# Patient Record
Sex: MALE | Race: WHITE | NOT HISPANIC OR LATINO | Employment: FULL TIME | URBAN - METROPOLITAN AREA
[De-identification: names, ages, dates, MRNs, and addresses within clinical notes are randomized per-mention and may not be internally consistent; named-entity substitution may affect disease eponyms.]

---

## 2024-03-04 ENCOUNTER — APPOINTMENT (OUTPATIENT)
Dept: RADIOLOGY | Facility: HOSPITAL | Age: 34
End: 2024-03-04
Payer: MEDICAID

## 2024-03-04 ENCOUNTER — HOSPITAL ENCOUNTER (EMERGENCY)
Facility: HOSPITAL | Age: 34
Discharge: HOME | End: 2024-03-05
Attending: EMERGENCY MEDICINE
Payer: MEDICAID

## 2024-03-04 VITALS
TEMPERATURE: 97.9 F | SYSTOLIC BLOOD PRESSURE: 158 MMHG | WEIGHT: 215 LBS | HEART RATE: 63 BPM | OXYGEN SATURATION: 100 % | BODY MASS INDEX: 30.1 KG/M2 | DIASTOLIC BLOOD PRESSURE: 96 MMHG | HEIGHT: 71 IN | RESPIRATION RATE: 18 BRPM

## 2024-03-04 DIAGNOSIS — M25.561 ACUTE PAIN OF RIGHT KNEE: Primary | ICD-10-CM

## 2024-03-04 PROCEDURE — 73564 X-RAY EXAM KNEE 4 OR MORE: CPT | Mod: RIGHT SIDE | Performed by: RADIOLOGY

## 2024-03-04 PROCEDURE — 73564 X-RAY EXAM KNEE 4 OR MORE: CPT | Mod: RT

## 2024-03-04 PROCEDURE — 73552 X-RAY EXAM OF FEMUR 2/>: CPT | Mod: RIGHT SIDE | Performed by: RADIOLOGY

## 2024-03-04 PROCEDURE — 99284 EMERGENCY DEPT VISIT MOD MDM: CPT

## 2024-03-04 PROCEDURE — 2500000004 HC RX 250 GENERAL PHARMACY W/ HCPCS (ALT 636 FOR OP/ED)

## 2024-03-04 PROCEDURE — 96372 THER/PROPH/DIAG INJ SC/IM: CPT

## 2024-03-04 PROCEDURE — 73552 X-RAY EXAM OF FEMUR 2/>: CPT | Mod: RT

## 2024-03-04 RX ORDER — KETOROLAC TROMETHAMINE 30 MG/ML
30 INJECTION, SOLUTION INTRAMUSCULAR; INTRAVENOUS ONCE
Status: COMPLETED | OUTPATIENT
Start: 2024-03-04 | End: 2024-03-04

## 2024-03-04 RX ADMIN — KETOROLAC TROMETHAMINE 30 MG: 30 INJECTION, SOLUTION INTRAMUSCULAR at 22:26

## 2024-03-04 ASSESSMENT — COLUMBIA-SUICIDE SEVERITY RATING SCALE - C-SSRS
1. IN THE PAST MONTH, HAVE YOU WISHED YOU WERE DEAD OR WISHED YOU COULD GO TO SLEEP AND NOT WAKE UP?: NO
2. HAVE YOU ACTUALLY HAD ANY THOUGHTS OF KILLING YOURSELF?: NO
6. HAVE YOU EVER DONE ANYTHING, STARTED TO DO ANYTHING, OR PREPARED TO DO ANYTHING TO END YOUR LIFE?: NO

## 2024-03-05 NOTE — DISCHARGE INSTRUCTIONS
You were seen in the emergency department for knee pain.  Your imaging showed no fracture or dislocation.  At home you should rest, ice, and elevate your knee.  You should keep your knee compressed with the ace wrap provided.  You can take Tylenol or ibuprofen at home for pain.  You should follow-up with your primary care provider if you have any worsening of your symptoms or are unable to walk, you should come back to the emergency department.

## 2024-03-05 NOTE — ED TRIAGE NOTES
Pt to ED c/o right knee pain. Pt states a piece of steel fell on earlier at work. Pt ambulatory to ED but with pain. Pt states he took tylenol at home with no relief. Pt denies any other PMH.

## 2024-03-05 NOTE — ED PROVIDER NOTES
HPI   Chief Complaint   Patient presents with    Knee Pain       Patient is a 34-year-old male presenting to the ED with a knee injury.  He states he was at work when a steel/metal coil robbins fell onto his right knee.  He endorses immediate pain, swelling, and burning of the knee which has progressively worsened.  He took a few Tylenol for pain relief prior to presenting here today without significant improvement.  Patient states he is able to ambulate, although he favors his right leg when doing so.  Patient voices no other concerns at this time.                No data recorded                   Patient History   History reviewed. No pertinent past medical history.  History reviewed. No pertinent surgical history.  No family history on file.  Social History     Tobacco Use    Smoking status: Not on file    Smokeless tobacco: Not on file   Substance Use Topics    Alcohol use: Not on file    Drug use: Not on file       Physical Exam   ED Triage Vitals [03/04/24 2050]   Temperature Heart Rate Respirations BP   36.6 °C (97.9 °F) 63 18 (!) 158/96      Pulse Ox Temp src Heart Rate Source Patient Position   100 % -- -- --      BP Location FiO2 (%)     -- --       Physical Exam  Vitals reviewed.   Constitutional:       General: He is not in acute distress.     Appearance: He is not ill-appearing.   Cardiovascular:      Rate and Rhythm: Normal rate and regular rhythm.   Pulmonary:      Effort: Pulmonary effort is normal.      Breath sounds: Normal breath sounds.   Musculoskeletal:      Comments: Small abrasion with surrounding ecchymosis and swelling of the superior/medial aspect of the right knee.  Area is very TTP.  No bony tenderness or ligamental laxity.  Passive and resisted ROM mildly decreased secondary to pain.  Sensation and vascular status intact.  Posterior tibial and dorsalis pedis pulses palpated.   Skin:     General: Skin is warm and dry.      Capillary Refill: Capillary refill takes less than 2 seconds.    Neurological:      Mental Status: He is alert.         ED Course & MDM      XR femur right 2+ views    (Results Pending)   XR knee right 4+ views    (Results Pending)       Medical Decision Making  Patient is a 34-year-old male presenting to the ED with a knee injury.  History was obtained from the patient.  A steel/metal coil robbins fell onto his knee at work today.  He had immediate swelling, pain, and a burning sensation in the right knee.  On physical exam, patient appears stable and in no acute distress.  There is a small abrasion with surrounding ecchymosis on the medial/superior aspect of the right knee.  No bony tenderness or ligamental laxity.  ROM slightly decreased secondary to pain.  Sensation and vascular status intact.  Lower extremity pulses palpated.  Skin is otherwise warm and dry.  Remainder of exam as noted above.  Patient is hypertensive with a blood pressure of 158/96.  Otherwise stable vital signs.    Patient received a dose of Toradol upon arrival to the ED.  He had already taken multiple doses of Tylenol prior to presenting.  Differential diagnosis consists of knee contusion, sprain, or hematoma.  I have low suspicion for any kind of acute fracture, dislocation, or bony abnormality.  Given the severity of tenderness in the area of injury and close proximity to the femur, x-rays were obtained of the right femur and right knee.  Anticipating benign imaging, patient will be able to be discharged home this evening with education on RICE criteria and prescriptions for symptomatic care.  Signout given to overnight resident @ [facility] pending x-rays.        Procedure  Procedures     Violet Sr PA-C  03/04/24 0090

## 2024-03-05 NOTE — PROGRESS NOTES
I received Chapo Westbrook in signout from Violet Sr PA-C.  Please see the previous note for all HPI, PE and MDM up to the time of signout at 2300.  This is in addition to the primary documentation.    In brief Chapo Westbrook is an 34 y.o. male presenting for knee injury after a steel/metal coil robbins fell onto his knee at work.  Chief Complaint   Patient presents with    Knee Pain      Imaging of the patient's R femur was unremarkable. At time of signout, disposition was pending final read of R knee XR.    MDM:  Please see previous provider's note for MDM up to time of signout at 2300. Unremarkable knee XR. Plan was for Ace wrap to R knee, rest/ice/compression/elevation, and symptomatic control with tylenol and ibuprofen as needed.     Called to patient in waiting room on two separate occasions with no answer. Called the patient's phone with no answer.    Assessment and plan discussed with Dr. Martin    Pt Disposition: Left without treatment complete    Marisela Monsalve MD   Emergency Medicine, PGY-1

## 2024-06-01 ENCOUNTER — APPOINTMENT (OUTPATIENT)
Dept: RADIOLOGY | Facility: HOSPITAL | Age: 34
End: 2024-06-01

## 2024-06-01 ENCOUNTER — HOSPITAL ENCOUNTER (EMERGENCY)
Facility: HOSPITAL | Age: 34
Discharge: HOME | End: 2024-06-01

## 2024-06-01 VITALS
BODY MASS INDEX: 33.21 KG/M2 | OXYGEN SATURATION: 97 % | TEMPERATURE: 97.7 F | HEIGHT: 70 IN | RESPIRATION RATE: 18 BRPM | DIASTOLIC BLOOD PRESSURE: 80 MMHG | WEIGHT: 232 LBS | HEART RATE: 85 BPM | SYSTOLIC BLOOD PRESSURE: 127 MMHG

## 2024-06-01 DIAGNOSIS — B34.9 VIRAL SYNDROME: Primary | ICD-10-CM

## 2024-06-01 LAB
ALBUMIN SERPL BCP-MCNC: 4.2 G/DL (ref 3.4–5)
ALP SERPL-CCNC: 64 U/L (ref 33–120)
ALT SERPL W P-5'-P-CCNC: 19 U/L (ref 10–52)
ANION GAP SERPL CALC-SCNC: 11 MMOL/L (ref 10–20)
AST SERPL W P-5'-P-CCNC: 22 U/L (ref 9–39)
BASOPHILS # BLD AUTO: 0.02 X10*3/UL (ref 0–0.1)
BASOPHILS NFR BLD AUTO: 0.5 %
BILIRUB SERPL-MCNC: 0.5 MG/DL (ref 0–1.2)
BUN SERPL-MCNC: 13 MG/DL (ref 6–23)
CALCIUM SERPL-MCNC: 9.3 MG/DL (ref 8.6–10.6)
CHLORIDE SERPL-SCNC: 104 MMOL/L (ref 98–107)
CO2 SERPL-SCNC: 29 MMOL/L (ref 21–32)
CREAT SERPL-MCNC: 0.95 MG/DL (ref 0.5–1.3)
EGFRCR SERPLBLD CKD-EPI 2021: >90 ML/MIN/1.73M*2
EOSINOPHIL # BLD AUTO: 0.06 X10*3/UL (ref 0–0.7)
EOSINOPHIL NFR BLD AUTO: 1.4 %
ERYTHROCYTE [DISTWIDTH] IN BLOOD BY AUTOMATED COUNT: 12.2 % (ref 11.5–14.5)
GLUCOSE SERPL-MCNC: 99 MG/DL (ref 74–99)
HCT VFR BLD AUTO: 39.9 % (ref 41–52)
HGB BLD-MCNC: 14.4 G/DL (ref 13.5–17.5)
IMM GRANULOCYTES # BLD AUTO: 0.01 X10*3/UL (ref 0–0.7)
IMM GRANULOCYTES NFR BLD AUTO: 0.2 % (ref 0–0.9)
LIPASE SERPL-CCNC: 24 U/L (ref 9–82)
LYMPHOCYTES # BLD AUTO: 1.26 X10*3/UL (ref 1.2–4.8)
LYMPHOCYTES NFR BLD AUTO: 28.4 %
MCH RBC QN AUTO: 31.5 PG (ref 26–34)
MCHC RBC AUTO-ENTMCNC: 36.1 G/DL (ref 32–36)
MCV RBC AUTO: 87 FL (ref 80–100)
MONOCYTES # BLD AUTO: 0.42 X10*3/UL (ref 0.1–1)
MONOCYTES NFR BLD AUTO: 9.5 %
NEUTROPHILS # BLD AUTO: 2.66 X10*3/UL (ref 1.2–7.7)
NEUTROPHILS NFR BLD AUTO: 60 %
NRBC BLD-RTO: 0 /100 WBCS (ref 0–0)
PLATELET # BLD AUTO: 202 X10*3/UL (ref 150–450)
POTASSIUM SERPL-SCNC: 3.9 MMOL/L (ref 3.5–5.3)
PROT SERPL-MCNC: 6.9 G/DL (ref 6.4–8.2)
RBC # BLD AUTO: 4.57 X10*6/UL (ref 4.5–5.9)
S PYO DNA THROAT QL NAA+PROBE: NOT DETECTED
SARS-COV-2 RNA RESP QL NAA+PROBE: NOT DETECTED
SODIUM SERPL-SCNC: 140 MMOL/L (ref 136–145)
WBC # BLD AUTO: 4.4 X10*3/UL (ref 4.4–11.3)

## 2024-06-01 PROCEDURE — 87651 STREP A DNA AMP PROBE: CPT | Performed by: PHYSICIAN ASSISTANT

## 2024-06-01 PROCEDURE — 80053 COMPREHEN METABOLIC PANEL: CPT | Performed by: PHYSICIAN ASSISTANT

## 2024-06-01 PROCEDURE — 36415 COLL VENOUS BLD VENIPUNCTURE: CPT | Performed by: PHYSICIAN ASSISTANT

## 2024-06-01 PROCEDURE — 87635 SARS-COV-2 COVID-19 AMP PRB: CPT | Performed by: PHYSICIAN ASSISTANT

## 2024-06-01 PROCEDURE — 83690 ASSAY OF LIPASE: CPT | Performed by: PHYSICIAN ASSISTANT

## 2024-06-01 PROCEDURE — 85025 COMPLETE CBC W/AUTO DIFF WBC: CPT | Performed by: PHYSICIAN ASSISTANT

## 2024-06-01 PROCEDURE — 99283 EMERGENCY DEPT VISIT LOW MDM: CPT

## 2024-06-01 PROCEDURE — 99284 EMERGENCY DEPT VISIT MOD MDM: CPT | Performed by: PHYSICIAN ASSISTANT

## 2024-06-01 PROCEDURE — 71046 X-RAY EXAM CHEST 2 VIEWS: CPT

## 2024-06-01 PROCEDURE — 2500000001 HC RX 250 WO HCPCS SELF ADMINISTERED DRUGS (ALT 637 FOR MEDICARE OP): Mod: SE | Performed by: PHYSICIAN ASSISTANT

## 2024-06-01 RX ORDER — BENZONATATE 100 MG/1
200 CAPSULE ORAL 2 TIMES DAILY
Qty: 12 CAPSULE | Refills: 0 | Status: SHIPPED | OUTPATIENT
Start: 2024-06-01 | End: 2024-06-04

## 2024-06-01 RX ORDER — BENZONATATE 100 MG/1
200 CAPSULE ORAL ONCE
Status: COMPLETED | OUTPATIENT
Start: 2024-06-01 | End: 2024-06-01

## 2024-06-01 RX ADMIN — BENZONATATE 200 MG: 100 CAPSULE ORAL at 14:53

## 2024-06-01 ASSESSMENT — COLUMBIA-SUICIDE SEVERITY RATING SCALE - C-SSRS
2. HAVE YOU ACTUALLY HAD ANY THOUGHTS OF KILLING YOURSELF?: NO
6. HAVE YOU EVER DONE ANYTHING, STARTED TO DO ANYTHING, OR PREPARED TO DO ANYTHING TO END YOUR LIFE?: NO
1. IN THE PAST MONTH, HAVE YOU WISHED YOU WERE DEAD OR WISHED YOU COULD GO TO SLEEP AND NOT WAKE UP?: NO

## 2024-06-01 NOTE — ED PROVIDER NOTES
HPI   Chief Complaint   Patient presents with    Flu Symptoms    Nausea    Vomiting    Chills    Cough    Diarrhea    Weakness, Gen    Fatigue    Headache       HPI: Patient is a 34-year-old male who presents to the ED for cold and flu symptoms that have been ongoing for the past week.  Patient states that his symptoms started with vomiting.  States that he was unable to tolerate any fluid except for Gatorade and Pedialyte.  He notes associated headache, hot and cold chills, sore throat, cough with gray/black phlegm and fatigue.  Denies any chest pain, shortness of breath or abdominal pain.  States that he was sent in by work who wanted him to be evaluated.  Notes that he vapes daily.   ------------------------------------------------------------------------------------------------------------------------------------------  ROS: a ten point review of systems was performed and was negative except as per HPI.  ------------------------------------------------------------------------------------------------------------------------------------------  PMH / PSH: as per HPI, otherwise reviewed   MEDS: as per HPI, otherwise reviewed in EMR  ALLERGIES: as per HPI, otherwise reviewed in EMR  SocH:  as per HPI, otherwise reviewed in EMR  FH:  as per HPI, otherwise reviewed in EMR   ------------------------------------------------------------------------------------------------------------------------------------------  Physical Exam:  VS: As documented in the triage note and EMR flowsheet from this visit was reviewed  General: Well appearing. No acute distress.   Eyes:  Extraocular movements grossly intact. No scleral icterus.   Head: Atraumatic. Normocephalic.     Neck: No meningismus. No gross masses. Full movement through range of motion  ENT: Posterior oropharynx shows no erythema, exudate or edema.  Uvula is midline without edema.  No stridor or trismus  CV: Regular rhythm. No murmurs, rubs, gallops appreciated.   Resp:  Clear to auscultation bilaterally. No respiratory distress.    GI: Nontender. Soft. No masses. No rebound, rigidity or guarding.   MSK: Symmetric muscle bulk. No gross step offs or deformities.  Skin: Warm, dry. No rashes  Neuro: CN II-VII intact. A&O x3. Speech fluent. Alert. Moving all extremities. Ambulates with normal gait  Psych: Appropriate mood and affect for situation  ------------------------------------------------------------------------------------------------------------------------------------------  Hospital Course / Medical Decision Making: Patient is a 34-year-old male who presents to the ED for cold and flu symptoms.  Patient endorsing nausea, vomiting, sore throat, chills, headache and cough.  On examination he is well-appearing.  Vitals are stable.  Patient administered benzonatate.  CBC showed no evidence of leukocytosis or anemia.  CMP without renal, electrolyte or hepatic abnormality.  Lipase within normal range.  COVID and group A strep are negative.  Chest x-ray without acute cardiopulmonary process.  Suspect viral syndrome.  Patient given a note for work and a prescription for benzonatate.  Advised on conservative management.  Patient has remained hemodynamically stable throughout the course of their ED stay.  Patient is home-going.  Patient advised to return to the ED for any worsening symptoms.  Advised to follow-up with PCP.  Patient was discharged in stable condition.                                [place] Coma Scale Score: 15                     Patient History   No past medical history on file.  No past surgical history on file.  No family history on file.  Social History     Tobacco Use    Smoking status: Not on file    Smokeless tobacco: Not on file   Substance Use Topics    Alcohol use: Not on file    Drug use: Not on file       Physical Exam   ED Triage Vitals [06/01/24 1408]   Temperature Heart Rate Respirations BP   36.5 °C (97.7 °F) 85 18 127/80      Pulse Ox Temp Source Heart  Rate Source Patient Position   97 % Temporal -- --      BP Location FiO2 (%)     -- --       Physical Exam    ED Course & MDM   Diagnoses as of 06/01/24 1610   Viral syndrome       Medical Decision Making      Procedure  Procedures     Nyasia Hurtado PA-C  06/01/24 3855

## 2024-06-01 NOTE — Clinical Note
Chapo Westbrook was seen and treated in our emergency department on 6/1/2024.  He may return to work on 06/02/2024.       If you have any questions or concerns, please don't hesitate to call.      Nyasia Hurtado PA-C

## 2024-06-01 NOTE — ED TRIAGE NOTES
Patient presents to the Emergency department with a chief complaint of flu like symptoms since Sunday. Patient endorses headache, chills, nausea, vomiting, diarrhea, cough.